# Patient Record
Sex: MALE | Race: ASIAN | NOT HISPANIC OR LATINO | URBAN - METROPOLITAN AREA
[De-identification: names, ages, dates, MRNs, and addresses within clinical notes are randomized per-mention and may not be internally consistent; named-entity substitution may affect disease eponyms.]

---
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
## 2021-05-30 VITALS — WEIGHT: 113.75 LBS | BODY MASS INDEX: 20.16 KG/M2 | HEIGHT: 63 IN

## 2021-05-30 NOTE — PROGRESS NOTES
[facility] Well Child Check    ASSESSMENT & PLAN  Eh Mark Salgado is a 19 y.o. who has normal growth and normal development.    There are no diagnoses linked to this encounter.    Return to clinic in 1 year for a Well Child Check or sooner as needed    IMMUNIZATIONS/LABS  Immunizations were reviewed and orders were placed as appropriate.    REFERRALS  Dental:  Recommend routine dental care as appropriate.  Other:  No additional referrals were made at this time.    ANTICIPATORY GUIDANCE  I have reviewed age appropriate anticipatory guidance.    HEALTH HISTORY  Do you have any concerns that you'd like to discuss today?: No concerns     Left hand wart hx frozen 3 attempts previously  Roomed by: ravi    Accompanied by Mother    Refills needed? No    Do you have any forms that need to be filled out? No     services provided by: Agency     /Agency Name [facility]   Location of  Services: In person        Do you have any significant health concerns in your family history?: No  No family history on file.  Since your last visit, have there been any major changes in your family, such as a move, job change, separation, divorce, or death in the family?: No  Has a lack of transportation kept you from medical appointments?: No    Home  Who lives in your home?:  One of seven  Social History     Social History Narrative     Not on file     Do you have any concerns about losing your housing?: No  Is your housing safe and comfortable?: Yes  Do you have any trouble with sleep?:  No    Education  What school do you child attend?:  [employer]  What grade are you in?:  12th  How do you perform in school (grades, behavior, attention, homework?: bs as     Eating  Do you eat regular meals including fruits and vegetables?:  yes  What are you drinking (cow's milk, water, soda, juice, sports drinks, energy drinks, etc)?: water  Have you been worried that you don't have enough food?:  "No  Do you have concerns about your body or appearance?:  No    Activities  Do you have friends?:  yes  Do you get at least one hour of physical activity per day?:  no  How many hours a day are you in front of a screen other than for schoolwork (computer, TV, phone)?:  3  What do you do for exercise?:  o  Do you have interest/participate in community activities/volunteers/school sports?:  no    MENTAL HEALTH SCREENING  PHQ-2 Total Score: 0 (7/11/2018 11:34 AM)    PHQ-9 Total Score: 0 (7/11/2018 11:34 AM)      VISION/HEARING  Vision: Completed. See Results  Hearing:  Completed. See Results     Hearing Screening    125Hz 250Hz 500Hz 1000Hz 2000Hz 3000Hz 4000Hz 6000Hz 8000Hz   Right ear:   Pass Pass Pass  Pass Pass    Left ear:   Pass Pass Pass  Pass Pass       Visual Acuity Screening    Right eye Left eye Both eyes   Without correction: 10/16 10/50    With correction:        Glasses at home.   Seen recently    TB Risk Assessment:  The patient and/or parent/guardian answer positive to:  parents born outside of the [place]    Dyslipidemia Risk Screening  Have either of your parents or any of your grandparents had a stroke or heart attack before age 55?: No  Any parents with high cholesterol or currently taking medications to treat?: No     Dental  When was the last time you saw the dentist?: 3-6 months ago   Fluoride varnish application risks and benefits discussed and verbal consent was received. Application completed today in clinic.    Patient Active Problem List   Diagnosis     Warts     Wears glasses     Scoliosis       Drugs  Does the patient use tobacco/alcohol/drugs?:  no    Safety  Does the patient have any safety concerns (peer or home)?:  no  Does the patient use safety belts, helmets and other safety equipment?:  yes    Sex  Have you ever had sex?:  No     Neg prior injuries or neuro or hematological issues    MEASUREMENTS  Height:  5' 4\" (1.626 m)  Weight: 118 lb (53.5 kg)  BMI: Body mass index is 20.25 " kg/m .  Blood Pressure: (!) 92/60  Blood pressure percentiles are not available for patients who are 18 years or older.    PHYSICAL EXAM  ROS: as noted above    OBJECTIVE:   Vitals:    07/11/19 1618   BP: (!) 92/60   Pulse: 72   Resp: 20   Temp: 98.1  F (36.7  C)      Wt is noted.  No diaphoresis  Eyes: nl eom, anicteric   External ears, nose: nl    Neck: nl nodes, supple, thyroid normal   Lungs: clear to ausc   Heart: regular rhythm  Abd: soft nontender   : normal exam for gender  No cva (renal) tenderness  Neuro: no weakness  Skin no rash   Left hand dorsum over 3rd mcp 2 cm wart  Joints: uninflamed   No ketotic breath odor noted  Mental: euthymic  Ext: nontender calves   Mobility  Normal for age    Nl back    Nl duck walk      ASSESSMENT/PLAN:   Health Maintenance/ Plan: anticipatory guidance, discussion of risk factors, lifestyle modification, and risk factor management.     Oldest of 7 kids    ELL  Encouraged.   Screen excess    Left hand wart frozen times two usual instructions    Sports form filled    Failed vision screen    Glasses at home      Additional diagnoses and related orders:  1. Encounter for routine child health examination without abnormal findings     2. Other viral warts     3. Wears glasses

## 2021-05-31 VITALS — HEIGHT: 64 IN | BODY MASS INDEX: 19.46 KG/M2 | WEIGHT: 114 LBS

## 2021-06-01 VITALS — HEIGHT: 64 IN | BODY MASS INDEX: 19.12 KG/M2 | WEIGHT: 112 LBS

## 2021-06-03 VITALS — WEIGHT: 118 LBS | HEIGHT: 64 IN | BODY MASS INDEX: 20.14 KG/M2

## 2021-06-04 VITALS
SYSTOLIC BLOOD PRESSURE: 90 MMHG | DIASTOLIC BLOOD PRESSURE: 60 MMHG | WEIGHT: 116 LBS | BODY MASS INDEX: 19.81 KG/M2 | HEIGHT: 64 IN | HEART RATE: 76 BPM

## 2021-06-09 NOTE — PROGRESS NOTES
Cough sore throat  No known exposure  Fever  Non productive.  No headache.  3 days.  Worst sx is sore throat.    Mother with same    No britton.  No sob  No rash  Urine and stool same    Right hand wart came back years after treatment.  Wants re try     Dorsum just prox to third mcp.   Normal distal sensation and function.  ROS: as noted above    OBJECTIVE:   Vitals:    03/08/17 1318   BP: 112/70   Pulse: 86   Resp: 20   Temp: 102.9  F (39.4  C)   SpO2: 99%      Head: atraumatic   Eyes: nl eom, anicteric   Ears: nl external ears tms  Neck: nl nodes, supple   Lungs: clear to ausc   Heart: regular rhythm  Back: no tenderness  Abd: soft nontender   Joints: uninflamed   Ext: nontender calves   Mental: euthymic  Neuro: no weakness  Gait: normal  7-8 mm wart  Positive rapid    ASSESSMENT/PLAN:    Two new problems  Discussion ed with mother re wart options.  Chose to treat with liquid nitrogen times two.    Additional diagnoses and related orders:  1. Sore throat  Rapid Strep A Screen-Throat    penicillin VK (PEN VK) 500 MG tablet   2. Other viral warts

## 2021-06-10 NOTE — PROGRESS NOTES
Adult Physical:    CC:  Physical.    HPI:  Needs tuberculosis test done for work.  Otherwise has no complaints.    Patient Active Problem List   Diagnosis     Warts     Wears glasses     Scoliosis       Past Medical History:  History reviewed. No pertinent past medical history.      Past Surgical History:  History reviewed. No pertinent surgical history.    Medicines:  None.    Allergies:  No Known Allergies    Family History:  No family history on file.    Social History:  Social History     Socioeconomic History     Marital status: Single     Spouse name: Not on file     Number of children: Not on file     Years of education: Not on file     Highest education level: Not on file   Occupational History     Not on file   Social Needs     Financial resource strain: Not on file     Food insecurity     Worry: Not on file     Inability: Not on file     Transportation needs     Medical: Not on file     Non-medical: Not on file   Tobacco Use     Smoking status: Never Smoker     Smokeless tobacco: Never Used     Tobacco comment: no second hand smoke   Substance and Sexual Activity     Alcohol use: Not on file     Drug use: Not on file     Sexual activity: Not on file   Lifestyle     Physical activity     Days per week: Not on file     Minutes per session: Not on file     Stress: Not on file   Relationships     Social connections     Talks on phone: Not on file     Gets together: Not on file     Attends Rastafarian service: Not on file     Active member of club or organization: Not on file     Attends meetings of clubs or organizations: Not on file     Relationship status: Not on file     Intimate partner violence     Fear of current or ex partner: Not on file     Emotionally abused: Not on file     Physically abused: Not on file     Forced sexual activity: Not on file   Other Topics Concern     Not on file   Social History Narrative     Not on file       Review of Systems:  10 point review of systems negative.    Physical  "Exam:  BP 90/60 (Patient Site: Left Arm, Patient Position: Sitting, Cuff Size: Adult Regular)   Pulse 76   Ht 5' 4.25\" (1.632 m)   Wt 116 lb (52.6 kg)   BMI 19.76 kg/m    Gen:   Alert, not distressed  Head:   Normocephalic, without obvious abnormality, atraumatic  Eyes:   PERRL, conjunctiva/corneas clear, EOM's intact  Ears:   Normal tympanic membranes and external ear canals  Nose:    Mucosa normal, no drainage or sinus tenderness  Throat:    No erythema or exudates  Neck:    No adenopathy no nodules in thyroid, normal ROM  Lungs:    Clear to auscultation bilaterally, respirations unlabored  Chest wall:  No tenderness or deformity  Heart:     Regular, normal S1 and S2, no murmur, gallop or rub  Abdomen:  Soft, non-tender, no masses, no organomegaly  Back:    Out scoliosis, ROM normal, no CVA tenderness  Extremities: Extremities normal, atraumatic, no cyanosis or edema  Skin:   Skin color, texture, turgor normal, no rashes or lesions  Lymph nodes: Cervical, and supraclavicular, nodes normal  Neurologic: CNII-XII intact.   DTRs normal and symmetric.  Symmetric strength and sensation.    Immunizations Due:  None are due  Tobacco Cessation:   na    Cancer Screening:  Thing is due    Weight management:   The patient was counseled regarding nutrition, physical activity and Maintaining exercise    Assessment and Plan:  1. Screening for tuberculosis  - QTF-Mycobacterium tuberculosis by QuantiFERON-TB Gold Plus    "

## 2021-06-11 NOTE — PROGRESS NOTES
"Subjective: This patient comes in for a child and teen check/well-child physical.  He is 17 years old needed a Menactra shot.  He goes to [employer] he will be in 10th grade.  He plays soccer and volleyball    Patient denies any problems mom denies any problems    Verbal referral to the dentist    On exam he did have some curvature to the spine and I did get an x-ray there was some curvature present will await radiologist reading regarding degrees of curvature.  I will contact family regarding results and discuss follow-up plans.  He is having no symptoms regarding this.    He passed vision and hearing.  Otherwise is stable no concerns or issues he is doing well in school he states.    Tobacco status: He  reports that he has never smoked. He does not have any smokeless tobacco history on file.    Patient Active Problem List    Diagnosis Date Noted     Wears glasses 07/10/2016     Warts 08/05/2015       No current outpatient prescriptions on file.     No current facility-administered medications for this visit.        ROS:   10 point review of systems negative other than as outlined above    Objective:    /62 (Patient Site: Left Arm, Patient Position: Sitting, Cuff Size: Adult Regular)  Pulse 60  Temp 97.4  F (36.3  C) (Oral)   Resp 24  Ht 5' 3.5\" (1.613 m)  Wt 114 lb (51.7 kg)  BMI 19.88 kg/m2  Body mass index is 19.88 kg/(m^2).      General appearance normal    He did have asymmetry when looking at his back and curvature was consistent and found on the x-ray as well final reading pending    Exam otherwise normal please see the child and teen check form under the media section regarding that.        Assessment:  1. Encounter for routine child health examination with abnormal findings  Meningococcal MCV4P   2. Curvature of spine  XR Scoliosis AP Standing     Immunization update    Stable development    Scoliosis, await reading will plan to recheck probably in 6-12 months he is asymptomatic    Plan: As " outlined will contact him regarding results    This transcription uses voice recognition software, which may contain typographical errors.

## 2021-06-16 PROBLEM — M41.9 SCOLIOSIS: Status: ACTIVE | Noted: 2017-06-19

## 2021-06-19 NOTE — PROGRESS NOTES
[facility] Well Child Check    ASSESSMENT & PLAN  Eh Mark Salgado is a 18  y.o. 1  m.o. who has normal growth and normal development.    There are no diagnoses linked to this encounter.    Return to clinic in 1 year for a Well Child Check or sooner as needed    IMMUNIZATIONS/LABS  Immunizations were reviewed and orders were placed as appropriate.    REFERRALS  Dental:  Recommend routine dental care as appropriate.  Other:  No additional referrals were made at this time.    ANTICIPATORY GUIDANCE  I have reviewed age appropriate anticipatory guidance.    HEALTH HISTORY  Do you have any concerns that you'd like to discuss today?: ongoing left eye issue   Sees fine when wearing his glasses.  Last exam 9  Months ago    Left stomach sxs years.   comes and goes.  Only winter.  Occurs with sitting.  Cannot describe.  Neg vomit , diarrhea, no hematochezia, no urine sx. No change with eat.        Roomed by: Vania    Accompanied by Mother    Refills needed? No    Do you have any forms that need to be filled out? No     services provided by: Agency     /Agency Name [facility]    Location of  Services: In person        Do you have any significant health concerns in your family history?: No  No family history on file.  Since your last visit, have there been any major changes in your family, such as a move, job change, separation, divorce, or death in the family?: No  Has a lack of transportation kept you from medical appointments?: No    Home  Who lives in your home?:  Family.  One of 7 children  Social History     Social History Narrative     Do you have any concerns about losing your housing?: No  Is your housing safe and comfortable?: Yes  Do you have any trouble with sleep?:  No    Education  What school do you child attend?:  [employer]  What grade are you in?:  11th  How do you perform in school (grades, behavior, attention, homework?: good.  Mostly As     Eating  Do  you eat regular meals including fruits and vegetables?:  yes  What are you drinking (cow's milk, water, soda, juice, sports drinks, energy drinks, etc)?: water  Have you been worried that you don't have enough food?: No  Do you have concerns about your body or appearance?:  No    Activities  Do you have friends?:  yes  Do you get at least one hour of physical activity per day?:  yes  How many hours a day are you in front of a screen other than for schoolwork (computer, TV, phone)?:  3  What do you do for exercise?:  soccer  Do you have interest/participate in community activities/volunteers/school sports?:  yes    MENTAL HEALTH SCREENING  PHQ-2 Total Score: 0 (7/11/2018 11:34 AM)  PHQ-9 Total Score: 0 (7/11/2018 11:34 AM)    VISION/HEARING  Vision: Completed. See Results  Hearing:  Completed. See Results     Hearing Screening    Method: Audiometry    125Hz 250Hz 500Hz 1000Hz 2000Hz 3000Hz 4000Hz 6000Hz 8000Hz   Right ear:   Pass Pass Pass  Pass Pass    Left ear:   Pass Pass Pass  Pass Pass       Visual Acuity Screening    Right eye Left eye Both eyes   Without correction: 10/12.5 10/63 10/16   With correction:          TB Risk Assessment:  The patient and/or parent/guardian answer positive to:  parents born outside of the [place]    Dyslipidemia Risk Screening  Have either of your parents or any of your grandparents had a stroke or heart attack before age 55?: No  Any parents with high cholesterol or currently taking medications to treat?: No     Dental  When was the last time you saw the dentist?: 6-12 months ago   Fluoride varnish application risks and benefits discussed and verbal consent was received. Application completed today in clinic.    Patient Active Problem List   Diagnosis     Warts     Wears glasses     Scoliosis       Drugs  Does the patient use tobacco/alcohol/drugs?:  no    Safety  Does the patient have any safety concerns (peer or home)?:  no  Does the patient use safety belts, helmets and other safety  "equipment?:  yes    Sex  Have you ever had sex?:  No    MEASUREMENTS  Height:  5' 4\" (1.626 m)  Weight: 112 lb (50.8 kg)  BMI: Body mass index is 19.22 kg/(m^2).  Blood Pressure: (!) 90/60  Blood pressure percentiles are 1 % systolic and 31 % diastolic based on NHBPEP's 4th Report. Blood pressure percentile targets: 90: 128/81, 95: 132/85, 99 + 5 mmH/98.    PHYSICAL EXAM  ROS: as noted above    OBJECTIVE:   Vitals:    18 1134   BP: (!) 90/60   Pulse: 72   Resp: 20      Wt is noted.  No diaphoresis  Eyes: nl eom, anicteric   External ears, nose: nl    Neck: nl nodes, supple, thyroid normal   Lungs: clear to ausc   Heart: regular rhythm  Abd: soft nontender   : normal exam for gender  No cva (renal) tenderness  Neuro: no weakness  Skin no rash    Two warts  Joints: uninflamed   No ketotic breath odor noted  Mental: euthymic  Ext: nontender calves   Gait: normal    11mm wart dorsum left third mcp and 2mm 2cm distal   Both frozen times 2 with liquid nitrogen        ASSESSMENT/PLAN:   Health Maintenance/ Plan: anticipatory guidance, discussion of risk factors, lifestyle modification, and risk factor management.    Advise to wear his glasses  Wart discussion and treatment  Additional diagnoses and related orders:  1. Routine infant or child health check  Hearing Screening    Vision Screening   2. Wears glasses     3. Warts           "

## 2021-06-20 NOTE — LETTER
Letter by Iglesia Elizabeth MD at      Author: Iglesia Elizabeth MD Service: -- Author Type: --    Filed:  Encounter Date: 8/14/2020 Status: (Other)          Mark Damian  [address] [place] [address]             August 14, 2020         Dear Mr. Salgado,    Below are the results from your recent visit:    Resulted Orders   QTF-Mycobacterium tuberculosis by QuantiFERON-TB Gold Plus   Result Value Ref Range    QTF RESULT Negative Negative    QTF INTREPRETATION       No interferon-gamma response to M. tuberculosis antigens was detected.  Infecton with M. tuberculosis is unlikely.  A negative result alone does not exclude infection with M. tuberculosis    QTF NIL 0.05 IU/mL    QTF ANTIGEN TB1-NIL 0.01 IU/mL    QTF ANTIGEN TB2 - NIL 0.01 IU/mL    QTF MITOGEN-NIL 5.18 IU/mL       The results of this test for tuberculosis are normal.    Please call with questions or contact us using [employer].    Sincerely,        Electronically signed by Iglesia Elizabeth MD

## 2023-05-10 ENCOUNTER — OFFICE VISIT (OUTPATIENT)
Dept: FAMILY MEDICINE | Facility: CLINIC | Age: 23
End: 2023-05-10
Payer: COMMERCIAL

## 2023-05-10 VITALS
DIASTOLIC BLOOD PRESSURE: 63 MMHG | BODY MASS INDEX: 19.46 KG/M2 | HEART RATE: 73 BPM | WEIGHT: 114 LBS | TEMPERATURE: 97.5 F | RESPIRATION RATE: 17 BRPM | OXYGEN SATURATION: 99 % | SYSTOLIC BLOOD PRESSURE: 102 MMHG | HEIGHT: 64 IN

## 2023-05-10 DIAGNOSIS — Z00.00 PHYSICAL EXAM, ANNUAL: Primary | ICD-10-CM

## 2023-05-10 DIAGNOSIS — Z11.59 NEED FOR HEPATITIS C SCREENING TEST: ICD-10-CM

## 2023-05-10 DIAGNOSIS — Z11.4 SCREENING FOR HIV (HUMAN IMMUNODEFICIENCY VIRUS): ICD-10-CM

## 2023-05-10 LAB
ALBUMIN SERPL BCG-MCNC: 4.8 G/DL (ref 3.5–5.2)
ALP SERPL-CCNC: 85 U/L (ref 40–129)
ALT SERPL W P-5'-P-CCNC: 13 U/L (ref 10–50)
ANION GAP SERPL CALCULATED.3IONS-SCNC: 13 MMOL/L (ref 7–15)
AST SERPL W P-5'-P-CCNC: 33 U/L (ref 10–50)
BILIRUB SERPL-MCNC: 0.6 MG/DL
BUN SERPL-MCNC: 18.4 MG/DL (ref 6–20)
CALCIUM SERPL-MCNC: 9.5 MG/DL (ref 8.6–10)
CHLORIDE SERPL-SCNC: 103 MMOL/L (ref 98–107)
CHOLEST SERPL-MCNC: 118 MG/DL
CREAT SERPL-MCNC: 1.16 MG/DL (ref 0.67–1.17)
DEPRECATED HCO3 PLAS-SCNC: 25 MMOL/L (ref 22–29)
GFR SERPL CREATININE-BSD FRML MDRD: >90 ML/MIN/1.73M2
GLUCOSE SERPL-MCNC: 76 MG/DL (ref 70–99)
HCV AB SERPL QL IA: NONREACTIVE
HDLC SERPL-MCNC: 49 MG/DL
HIV 1+2 AB+HIV1 P24 AG SERPL QL IA: NONREACTIVE
LDLC SERPL CALC-MCNC: 61 MG/DL
NONHDLC SERPL-MCNC: 69 MG/DL
POTASSIUM SERPL-SCNC: 4.3 MMOL/L (ref 3.4–5.3)
PROT SERPL-MCNC: 7.7 G/DL (ref 6.4–8.3)
SODIUM SERPL-SCNC: 141 MMOL/L (ref 136–145)
TRIGL SERPL-MCNC: 40 MG/DL

## 2023-05-10 PROCEDURE — 36415 COLL VENOUS BLD VENIPUNCTURE: CPT | Performed by: PHYSICIAN ASSISTANT

## 2023-05-10 PROCEDURE — 99395 PREV VISIT EST AGE 18-39: CPT | Performed by: PHYSICIAN ASSISTANT

## 2023-05-10 PROCEDURE — 80061 LIPID PANEL: CPT | Performed by: PHYSICIAN ASSISTANT

## 2023-05-10 PROCEDURE — 86803 HEPATITIS C AB TEST: CPT | Performed by: PHYSICIAN ASSISTANT

## 2023-05-10 PROCEDURE — 80053 COMPREHEN METABOLIC PANEL: CPT | Performed by: PHYSICIAN ASSISTANT

## 2023-05-10 PROCEDURE — 87389 HIV-1 AG W/HIV-1&-2 AB AG IA: CPT | Performed by: PHYSICIAN ASSISTANT

## 2023-05-10 ASSESSMENT — ENCOUNTER SYMPTOMS
FEVER: 0
COUGH: 0
HEARTBURN: 0
WEAKNESS: 0
CONSTIPATION: 0
SORE THROAT: 0
DIARRHEA: 0
HEMATURIA: 0
HEADACHES: 0
ARTHRALGIAS: 1
CHILLS: 0
DYSURIA: 0
PALPITATIONS: 0
FREQUENCY: 0
JOINT SWELLING: 0
MYALGIAS: 0
SHORTNESS OF BREATH: 0
EYE PAIN: 0
NERVOUS/ANXIOUS: 0
PARESTHESIAS: 0
DIZZINESS: 0
NAUSEA: 0
ABDOMINAL PAIN: 0
HEMATOCHEZIA: 0

## 2023-05-10 NOTE — PROGRESS NOTES
SUBJECTIVE:   CC: Cristobal is an 22 year old who presents for preventative health visit.       5/10/2023     7:51 AM   Additional Questions   Roomed by Vandana   Accompanied by self       Healthy Habits:     Getting at least 3 servings of Calcium per day:  Yes    Bi-annual eye exam:  NO    Dental care twice a year:  NO    Sleep apnea or symptoms of sleep apnea:  None    Diet:  Regular (no restrictions)    Frequency of exercise:  4-5 days/week    Duration of exercise:  45-60 minutes    Taking medications regularly:  Not Applicable    Medication side effects:  Not applicable    PHQ-2 Total Score: 0    Additional concerns today:  No    Today's PHQ-2 Score:       5/10/2023     7:43 AM   PHQ-2 ( 1999 [employer])   Q1: Little interest or pleasure in doing things 0   Q2: Feeling down, depressed or hopeless 0   PHQ-2 Score 0   Q1: Little interest or pleasure in doing things Not at all    Not at all   Q2: Feeling down, depressed or hopeless Not at all    Not at all   PHQ-2 Score 0    0     Social History     Tobacco Use     Smoking status: Never     Smokeless tobacco: Never     Tobacco comments:     no second hand smoke   Vaping Use     Vaping status: Never Used   Substance Use Topics     Alcohol use: Never             5/10/2023     7:43 AM   Alcohol Use   Prescreen: >3 drinks/day or >7 drinks/week? Not Applicable       Last PSA: No results found for: PSA    Reviewed orders with patient. Reviewed health maintenance and updated orders accordingly - Yes  Lab work is in process    Reviewed and updated as needed this visit by clinical staff   Tobacco  Allergies  Meds  Problems  Med Hx  Surg Hx  Fam Hx          Reviewed and updated as needed this visit by Provider   Tobacco  Allergies  Meds  Problems  Med Hx  Surg Hx  Fam Hx         History reviewed. No pertinent past medical history.     Review of Systems   Constitutional: Negative for chills and fever.   HENT: Negative for congestion, ear pain, hearing loss and sore throat.  "   Eyes: Negative for pain and visual disturbance.   Respiratory: Negative for cough and shortness of breath.    Cardiovascular: Negative for chest pain, palpitations and peripheral edema.   Gastrointestinal: Negative for abdominal pain, constipation, diarrhea, heartburn, hematochezia and nausea.   Genitourinary: Negative for dysuria, frequency, genital sores, hematuria, impotence, penile discharge and urgency.   Musculoskeletal: Positive for arthralgias. Negative for joint swelling and myalgias.   Skin: Negative for rash.   Neurological: Negative for dizziness, weakness, headaches and paresthesias.   Psychiatric/Behavioral: Negative for mood changes. The patient is not nervous/anxious.          OBJECTIVE:   /63 (BP Location: Right arm, Patient Position: Sitting, Cuff Size: Adult Regular)   Pulse 73   Temp 97.5  F (36.4  C) (Temporal)   Resp 17   Ht 1.63 m (5' 4.17\")   Wt 51.7 kg (114 lb)   SpO2 99%   BMI 19.46 kg/m      Physical Exam  GENERAL: healthy, alert and no distress  EYES: Eyes grossly normal to inspection, PERRL and conjunctivae and sclerae normal  HENT: ear canals and TM's normal, nose and mouth without ulcers or lesions  NECK: no adenopathy, no asymmetry, masses, or scars and thyroid normal to palpation  RESP: lungs clear to auscultation - no rales, rhonchi or wheezes  CV: regular rate and rhythm, normal S1 S2, no S3 or S4, no murmur, click or rub, no peripheral edema and peripheral pulses strong  ABDOMEN: soft, nontender, no hepatosplenomegaly, no masses and bowel sounds normal  MS: no gross musculoskeletal defects noted, no edema  SKIN: no suspicious lesions or rashes  NEURO: Normal strength and tone, mentation intact and speech normal  PSYCH: mentation appears normal, affect normal/bright    Diagnostic Test Results:  Labs reviewed in Epic    ASSESSMENT/PLAN:   (Z00.00) Physical exam, annual  (primary encounter diagnosis)  Comment:   Plan: Lipid Profile (Chol, Trig, HDL, LDL calc),         " Comprehensive metabolic panel (BMP + Alb, Alk         Phos, ALT, AST, Total. Bili, TP)            (Z11.4) Screening for HIV (human immunodeficiency virus)  Comment:   Plan: HIV Antigen Antibody Combo            (Z11.59) Need for hepatitis C screening test  Comment:   Plan: Hepatitis C Screen Reflex to HCV RNA Quant and         Genotype            COUNSELING:   Reviewed preventive health counseling, as reflected in patient instructions        He reports that he has never smoked. He has never used smokeless tobacco.            Sydney Hammonds PA-C  [facility]

## 2023-05-10 NOTE — PROGRESS NOTES
Prior to immunization administration, verified patients identity using patient s name and date of birth. Please see Immunization Activity for additional information.     Screening Questionnaire for Adult Immunization    Are you sick today?   No   Do you have allergies to medications, food, a vaccine component or latex?   No   Have you ever had a serious reaction after receiving a vaccination?   No   Do you have a long-term health problem with heart, lung, kidney, or metabolic disease (e.g., diabetes), asthma, a blood disorder, no spleen, complement component deficiency, a cochlear implant, or a spinal fluid leak?  Are you on long-term aspirin therapy?   No   Do you have cancer, leukemia, HIV/AIDS, or any other immune system problem?   No   Do you have a parent, brother, or sister with an immune system problem?   No   In the past 3 months, have you taken medications that affect  your immune system, such as prednisone, other steroids, or anticancer drugs; drugs for the treatment of rheumatoid arthritis, Crohn s disease, or psoriasis; or have you had radiation treatments?   No   Have you had a seizure, or a brain or other nervous system problem?   No   During the past year, have you received a transfusion of blood or blood    products, or been given immune (gamma) globulin or antiviral drug?   No   For women: Are you pregnant or is there a chance you could become       pregnant during the next month?   n/a   Have you received any vaccinations in the past 4 weeks?   No     Immunization questionnaire answers were all negative.      Injection of  given by Vandana Light MA. Patient instructed to remain in clinic for 15 minutes afterwards, and to report any adverse reactions.     Screening performed by Vandana Light MA on 5/10/2023 at 7:55 AM.    Answers for HPI/ROS submitted by the patient on 5/10/2023  Frequency of exercise:: 4-5 days/week  Getting at least 3 servings of Calcium per day:: Yes  Diet:: Regular (no  restrictions)  Taking medications regularly:: Not Applicable  Medication side effects:: Not applicable  Bi-annual eye exam:: NO  Dental care twice a year:: NO  Sleep apnea or symptoms of sleep apnea:: None  abdominal pain: No  Blood in stool: No  Blood in urine: No  chest pain: No  chills: No  congestion: No  constipation: No  cough: No  diarrhea: No  dizziness: No  ear pain: No  eye pain: No  nervous/anxious: No  fever: No  frequency: No  genital sores: No  headaches: No  hearing loss: No  heartburn: No  arthralgias: Yes  joint swelling: No  peripheral edema: No  mood changes: No  myalgias: No  nausea: No  dysuria: No  palpitations: No  Skin sensation changes: No  sore throat: No  urgency: No  rash: No  shortness of breath: No  visual disturbance: No  weakness: No  impotence: No  penile discharge: No  Additional concerns today:: No  Duration of exercise:: 45-60 minutes

## 2023-05-11 ENCOUNTER — TELEPHONE (OUTPATIENT)
Dept: FAMILY MEDICINE | Facility: CLINIC | Age: 23
End: 2023-05-11
Payer: COMMERCIAL

## 2023-05-11 NOTE — TELEPHONE ENCOUNTER
----- Message from Sydney Hammonds PA-C sent at 5/11/2023  2:24 PM CDT -----  Team - please call patient with results.  Labs all are normal.  Liver, kidny, cholesterol look good.  Negative HIV and Hep C screening.  Thanks!  Sydney Hammonds PA-C on 5/11/2023 at 2:24 PM

## 2023-05-11 NOTE — LETTER
May 15, 2023      [facility]  [address] [place] [address]        Dear ,    We are writing to inform you of your test results.    Labs all are normal.  Liver, kidney, cholesterol look good.  Negative HIV and Hep C screening.    Resulted Orders   HIV Antigen Antibody Combo   Result Value Ref Range    HIV Antigen Antibody Combo Nonreactive Nonreactive      Comment:      HIV-1 p24 Ag & HIV-1/HIV-2 Ab Not Detected   Hepatitis C Screen Reflex to HCV RNA Quant and Genotype   Result Value Ref Range    Hepatitis C Antibody Nonreactive Nonreactive    Narrative    Assay performance characteristics have not been established for newborns, infants, and children.   Lipid Profile (Chol, Trig, HDL, LDL calc)   Result Value Ref Range    Cholesterol 118 <200 mg/dL    Triglycerides 40 <150 mg/dL    Direct Measure HDL 49 >=40 mg/dL    LDL Cholesterol Calculated 61 <=100 mg/dL    Non HDL Cholesterol 69 <130 mg/dL    Narrative    Cholesterol  Desirable:  <200 mg/dL    Triglycerides  Normal:  Less than 150 mg/dL  Borderline High:  150-199 mg/dL  High:  200-499 mg/dL  Very High:  Greater than or equal to 500 mg/dL    Direct Measure HDL  Female:  Greater than or equal to 50 mg/dL   Male:  Greater than or equal to 40 mg/dL    LDL Cholesterol  Desirable:  <100mg/dL  Above Desirable:  100-129 mg/dL   Borderline High:  130-159 mg/dL   High:  160-189 mg/dL   Very High:  >= 190 mg/dL    Non HDL Cholesterol  Desirable:  130 mg/dL  Above Desirable:  130-159 mg/dL  Borderline High:  160-189 mg/dL  High:  190-219 mg/dL  Very High:  Greater than or equal to 220 mg/dL   Comprehensive metabolic panel (BMP + Alb, Alk Phos, ALT, AST, Total. Bili, TP)   Result Value Ref Range    Sodium 141 136 - 145 mmol/L    Potassium 4.3 3.4 - 5.3 mmol/L    Chloride 103 98 - 107 mmol/L    Carbon Dioxide (CO2) 25 22 - 29 mmol/L    Anion Gap 13 7 - 15 mmol/L    Urea Nitrogen 18.4 6.0 - 20.0 mg/dL    Creatinine 1.16 0.67 - 1.17 mg/dL    Calcium 9.5 8.6 - 10.0  mg/dL    Glucose 76 70 - 99 mg/dL    Alkaline Phosphatase 85 40 - 129 U/L    AST 33 10 - 50 U/L    ALT 13 10 - 50 U/L    Protein Total 7.7 6.4 - 8.3 g/dL    Albumin 4.8 3.5 - 5.2 g/dL    Bilirubin Total 0.6 <=1.2 mg/dL    GFR Estimate >90 >60 mL/min/1.73m2      Comment:      eGFR calculated using 2021 CKD-EPI equation.       If you have any questions or concerns, please call the clinic at the number listed above.       Sincerely,          Sydney Hammonds PA-C

## 2024-07-20 ENCOUNTER — HEALTH MAINTENANCE LETTER (OUTPATIENT)
Age: 24
End: 2024-07-20

## 2025-08-09 ENCOUNTER — HEALTH MAINTENANCE LETTER (OUTPATIENT)
Age: 25
End: 2025-08-09